# Patient Record
Sex: MALE | Race: WHITE
[De-identification: names, ages, dates, MRNs, and addresses within clinical notes are randomized per-mention and may not be internally consistent; named-entity substitution may affect disease eponyms.]

---

## 2019-10-07 ENCOUNTER — HOSPITAL ENCOUNTER (INPATIENT)
Dept: HOSPITAL 97 - ER | Age: 34
LOS: 6 days | Discharge: HOME | DRG: 896 | End: 2019-10-13
Attending: FAMILY MEDICINE | Admitting: HOSPITALIST
Payer: SELF-PAY

## 2019-10-07 VITALS — BODY MASS INDEX: 22.6 KG/M2

## 2019-10-07 DIAGNOSIS — K70.0: ICD-10-CM

## 2019-10-07 DIAGNOSIS — I10: ICD-10-CM

## 2019-10-07 DIAGNOSIS — J96.00: ICD-10-CM

## 2019-10-07 DIAGNOSIS — T51.0X1A: ICD-10-CM

## 2019-10-07 DIAGNOSIS — F10.231: Primary | ICD-10-CM

## 2019-10-07 LAB
HCT VFR BLD CALC: 38.3 % (ref 39.6–49)
INR BLD: 0.97
LYMPHOCYTES # SPEC AUTO: 1.2 K/UL (ref 0.7–4.9)
PMV BLD: 10.9 FL (ref 7.6–11.3)
RBC # BLD: 3.95 M/UL (ref 4.33–5.43)

## 2019-10-07 PROCEDURE — 96375 TX/PRO/DX INJ NEW DRUG ADDON: CPT

## 2019-10-07 PROCEDURE — 80329 ANALGESICS NON-OPIOID 1 OR 2: CPT

## 2019-10-07 PROCEDURE — 80307 DRUG TEST PRSMV CHEM ANLYZR: CPT

## 2019-10-07 PROCEDURE — 82746 ASSAY OF FOLIC ACID SERUM: CPT

## 2019-10-07 PROCEDURE — 85025 COMPLETE CBC W/AUTO DIFF WBC: CPT

## 2019-10-07 PROCEDURE — 80320 DRUG SCREEN QUANTALCOHOLS: CPT

## 2019-10-07 PROCEDURE — 93005 ELECTROCARDIOGRAM TRACING: CPT

## 2019-10-07 PROCEDURE — 82248 BILIRUBIN DIRECT: CPT

## 2019-10-07 PROCEDURE — 80074 ACUTE HEPATITIS PANEL: CPT

## 2019-10-07 PROCEDURE — 31500 INSERT EMERGENCY AIRWAY: CPT

## 2019-10-07 PROCEDURE — 84100 ASSAY OF PHOSPHORUS: CPT

## 2019-10-07 PROCEDURE — 80048 BASIC METABOLIC PNL TOTAL CA: CPT

## 2019-10-07 PROCEDURE — 82607 VITAMIN B-12: CPT

## 2019-10-07 PROCEDURE — 70450 CT HEAD/BRAIN W/O DYE: CPT

## 2019-10-07 PROCEDURE — 83735 ASSAY OF MAGNESIUM: CPT

## 2019-10-07 PROCEDURE — 99291 CRITICAL CARE FIRST HOUR: CPT

## 2019-10-07 PROCEDURE — 51702 INSERT TEMP BLADDER CATH: CPT

## 2019-10-07 PROCEDURE — 81003 URINALYSIS AUTO W/O SCOPE: CPT

## 2019-10-07 PROCEDURE — 97112 NEUROMUSCULAR REEDUCATION: CPT

## 2019-10-07 PROCEDURE — 94002 VENT MGMT INPAT INIT DAY: CPT

## 2019-10-07 PROCEDURE — 36415 COLL VENOUS BLD VENIPUNCTURE: CPT

## 2019-10-07 PROCEDURE — 83690 ASSAY OF LIPASE: CPT

## 2019-10-07 PROCEDURE — 96372 THER/PROPH/DIAG INJ SC/IM: CPT

## 2019-10-07 PROCEDURE — 84484 ASSAY OF TROPONIN QUANT: CPT

## 2019-10-07 PROCEDURE — 76700 US EXAM ABDOM COMPLETE: CPT

## 2019-10-07 PROCEDURE — 99292 CRITICAL CARE ADDL 30 MIN: CPT

## 2019-10-07 PROCEDURE — 85730 THROMBOPLASTIN TIME PARTIAL: CPT

## 2019-10-07 PROCEDURE — 84132 ASSAY OF SERUM POTASSIUM: CPT

## 2019-10-07 PROCEDURE — 94003 VENT MGMT INPAT SUBQ DAY: CPT

## 2019-10-07 PROCEDURE — 97161 PT EVAL LOW COMPLEX 20 MIN: CPT

## 2019-10-07 PROCEDURE — 80053 COMPREHEN METABOLIC PANEL: CPT

## 2019-10-07 PROCEDURE — 80076 HEPATIC FUNCTION PANEL: CPT

## 2019-10-07 PROCEDURE — 85610 PROTHROMBIN TIME: CPT

## 2019-10-07 PROCEDURE — 97116 GAIT TRAINING THERAPY: CPT

## 2019-10-07 PROCEDURE — 71045 X-RAY EXAM CHEST 1 VIEW: CPT

## 2019-10-07 PROCEDURE — 96374 THER/PROPH/DIAG INJ IV PUSH: CPT

## 2019-10-08 LAB
ALBUMIN SERPL BCP-MCNC: 3.6 G/DL (ref 3.4–5)
ALBUMIN SERPL BCP-MCNC: 3.7 G/DL (ref 3.4–5)
ALBUMIN SERPL BCP-MCNC: 4 G/DL (ref 3.4–5)
ALP SERPL-CCNC: 211 U/L (ref 45–117)
ALP SERPL-CCNC: 212 U/L (ref 45–117)
ALP SERPL-CCNC: 249 U/L (ref 45–117)
ALT SERPL W P-5'-P-CCNC: 673 U/L (ref 12–78)
ALT SERPL W P-5'-P-CCNC: 679 U/L (ref 12–78)
ALT SERPL W P-5'-P-CCNC: 700 U/L (ref 12–78)
AST SERPL W P-5'-P-CCNC: 722 U/L (ref 15–37)
AST SERPL W P-5'-P-CCNC: 758 U/L (ref 15–37)
AST SERPL W P-5'-P-CCNC: 878 U/L (ref 15–37)
BUN BLD-MCNC: 10 MG/DL (ref 7–18)
BUN BLD-MCNC: 8 MG/DL (ref 7–18)
BUN BLD-MCNC: 8 MG/DL (ref 7–18)
GIANT PLATELETS BLD QL SMEAR: (no result)
GLUCOSE SERPLBLD-MCNC: 105 MG/DL (ref 74–106)
GLUCOSE SERPLBLD-MCNC: 83 MG/DL (ref 74–106)
GLUCOSE SERPLBLD-MCNC: 86 MG/DL (ref 74–106)
HCT VFR BLD CALC: 39.7 % (ref 39.6–49)
HCT VFR BLD CALC: 40 % (ref 39.6–49)
INR BLD: 0.99
LIPASE SERPL-CCNC: 129 U/L (ref 73–393)
LIPASE SERPL-CCNC: 133 U/L (ref 73–393)
LYMPHOCYTES # SPEC AUTO: 0.9 K/UL (ref 0.7–4.9)
LYMPHOCYTES # SPEC AUTO: 1.3 K/UL (ref 0.7–4.9)
MAGNESIUM SERPL-MCNC: 2 MG/DL (ref 1.8–2.4)
METHAMPHET UR QL SCN: NEGATIVE
MORPHOLOGY BLD-IMP: (no result)
PMV BLD: 9.8 FL (ref 7.6–11.3)
PMV BLD: 9.9 FL (ref 7.6–11.3)
POTASSIUM SERPL-SCNC: 3 MMOL/L (ref 3.5–5.1)
POTASSIUM SERPL-SCNC: 3.3 MMOL/L (ref 3.5–5.1)
POTASSIUM SERPL-SCNC: 3.4 MMOL/L (ref 3.5–5.1)
RBC # BLD: 4.01 M/UL (ref 4.33–5.43)
RBC # BLD: 4.04 M/UL (ref 4.33–5.43)
THC SERPL-MCNC: NEGATIVE NG/ML

## 2019-10-08 PROCEDURE — 5A1945Z RESPIRATORY VENTILATION, 24-96 CONSECUTIVE HOURS: ICD-10-PCS

## 2019-10-08 PROCEDURE — 0BH17EZ INSERTION OF ENDOTRACHEAL AIRWAY INTO TRACHEA, VIA NATURAL OR ARTIFICIAL OPENING: ICD-10-PCS

## 2019-10-08 RX ADMIN — SODIUM CHLORIDE SCH MLS: 9 INJECTION, SOLUTION INTRAVENOUS at 10:31

## 2019-10-08 RX ADMIN — HALOPERIDOL LACTATE PRN MG: 5 INJECTION, SOLUTION INTRAMUSCULAR at 16:13

## 2019-10-08 RX ADMIN — HALOPERIDOL LACTATE PRN MG: 5 INJECTION, SOLUTION INTRAMUSCULAR at 23:19

## 2019-10-08 RX ADMIN — LEVETIRACETAM SCH MLS: 100 INJECTION, SOLUTION, CONCENTRATE INTRAVENOUS at 20:24

## 2019-10-08 RX ADMIN — MIDAZOLAM PRN MLS: 5 INJECTION INTRAMUSCULAR; INTRAVENOUS at 20:13

## 2019-10-08 RX ADMIN — Medication SCH ML: at 20:21

## 2019-10-08 RX ADMIN — LEVETIRACETAM SCH MLS: 100 INJECTION, SOLUTION, CONCENTRATE INTRAVENOUS at 10:31

## 2019-10-08 RX ADMIN — ENOXAPARIN SODIUM SCH MG: 40 INJECTION SUBCUTANEOUS at 10:32

## 2019-10-08 RX ADMIN — PROPOFOL PRN MLS: 10 INJECTION, EMULSION INTRAVENOUS at 23:10

## 2019-10-08 RX ADMIN — SODIUM CHLORIDE SCH MLS: 0.9 INJECTION, SOLUTION INTRAVENOUS at 22:20

## 2019-10-08 RX ADMIN — SODIUM CHLORIDE SCH: 0.9 INJECTION, SOLUTION INTRAVENOUS at 13:00

## 2019-10-08 RX ADMIN — Medication SCH ML: at 10:43

## 2019-10-08 RX ADMIN — PROPOFOL PRN MLS: 10 INJECTION, EMULSION INTRAVENOUS at 19:15

## 2019-10-08 RX ADMIN — PROPOFOL PRN MLS: 10 INJECTION, EMULSION INTRAVENOUS at 15:14

## 2019-10-08 RX ADMIN — SODIUM CHLORIDE SCH MLS: 0.9 INJECTION, SOLUTION INTRAVENOUS at 03:00

## 2019-10-08 NOTE — RAD REPORT
EXAM DESCRIPTION:     CT Head Without Intravenous Contrast

 

CLINICAL HISTORY:  The patient is 34 years old and is Male; CONFUSED

 

TECHNIQUE:  Axial computed tomography images of the head/brain without intravenous contrast.   Sagitt
al and coronal reformatted images were created and reviewed.   This CT exam was performed using one o
r more of the following dose reduction techniques:   automated exposure control, adjustment of the mA
 and/or kV according to patient size, and/or use of iterative reconstruction technique.

 

COMPARISON:  No relevant prior studies available.

 

FINDINGS:  BRAIN:   Unremarkable.   The gray-white matter differentiation is preserved . No hemorrhag
e.   No significant white matter disease.   No edema. No extra-axial fluid collections.

   VENTRICLES:   Unremarkable.   No ventriculomegaly.

   BONES/JOINTS:   No acute fracture.

   SOFT TISSUES:   Unremarkable.

   SINUSES:   Unremarkable as visualized.   No acute sinusitis.

   MASTOID AIR CELLS:   Unremarkable as visualized.   No mastoid effusion.

   ORBITS:   Unremarkable as visualized.

 

IMPRESSION:  No acute intracranial findings.

 

Electronically signed by:   Fiorella Smith MD   10/8/2019 6:26 AM CDT Workstation: [id number]

 

 

 

Due to temporary technical issues with the PACS/Fluency reporting system, reports are being signed by
 the in house radiologist as a courtesy to ensure prompt reporting. The interpreting radiologist is f
ully responsible for the content of the report.

## 2019-10-08 NOTE — ER
Nurse's Notes                                                                                     

 [facility]                                                                 

Name: Ricardo Pedro                                                                            

Age: 34 yrs                                                                                       

Sex: Male                                                                                         

: 1985                                                                                   

MRN: [id number]                                                                                   

Arrival Date: 10/07/2019                                                                          

Time: 23:30                                                                                       

Account#: [id number]                                                                            

Bed 2                                                                                             

Private MD:                                                                                       

Diagnosis: Delirium due to known physiological condition;Alcohol abuse                            

                                                                                                  

Presentation:                                                                                     

10/07                                                                                             

23:31 Presenting complaint: EMS states: PD on scene. pt girlfriend called stating pt was      ak1 

      "having a seizure" pt A\T\OX3. pt admits to "using weed to sleep" to EMS. pt denies drug    

      use to nurse during triage. EMS administered 100mL NS through 18g at the right forearm.     

      Transition of care: patient was not received from another setting of care. Onset of         

      symptoms was 2019. Risk Assessment: Do you want to hurt yourself or someone     

      else? Patient reports no desire to harm self or others. Initial Sepsis Screen: Does the     

      patient meet any 2 criteria? No. Patient's initial sepsis screen is negative. Does the      

      patient have a suspected source of infection? No. Patient's initial sepsis screen is        

      negative. Care prior to arrival: None.                                                      

23:31 Method Of Arrival: EMS: [facility]                                                ak1 

23:31 Acuity: LILY 1                                                                           bb  

                                                                                                  

Triage Assessment:                                                                                

23:33 General: Appears in no apparent distress. well groomed, Behavior is cooperative,        ak1 

      anxious, restless. Pain: Complains of pain in jaw pain. EENT: No signs and/or symptoms      

      were reported regarding the EENT system. Neuro: Level of Consciousness is awake, alert,     

      obeys commands, Oriented to person, place, situation,  are equal bilaterally Moves     

      all extremities. Full function Speech is normal, Facial symmetry appears normal, Pupils     

      are dilated. Cardiovascular: Patient's skin is warm and dry. Rhythm is sinus                

      tachycardia. Respiratory: Airway is patent Respiratory effort is even, unlabored. GI:       

      No signs and/or symptoms were reported involving the gastrointestinal system. : No        

      signs and/or symptoms were reported regarding the genitourinary system. Derm: No signs      

      and/or symptoms reported regarding the dermatologic system. Musculoskeletal: No signs       

      and/or symptoms reported regarding the musculoskeletal system.                              

                                                                                                  

Historical:                                                                                       

- Allergies:                                                                                      

23:33 No Known Allergies;                                                                     ak1 

- Home Meds:                                                                                      

23:33 None [Active];                                                                          ak1 

- PMHx:                                                                                           

23:33 Hypertension;                                                                           ak1 

- PSHx:                                                                                           

23:33 None;                                                                                   ak1 

                                                                                                  

- Immunization history:: Adult Immunizations unknown.                                             

- Social history:: Smoking status: Patient uses tobacco products, smokes two packs                

  cigarettes per day. Patient/guardian denies using alcohol, street drugs.                        

- Ebola Screening: : No symptoms or risks identified at this time.                                

                                                                                                  

                                                                                                  

Screenin:38 Abuse screen: Denies threats or abuse. Denies injuries from another. Nutritional        ak1 

      screening: No deficits noted. Tuberculosis screening: No symptoms or risk factors           

      identified. Fall Risk IV access (20 points).                                                

                                                                                                  

Assessment:                                                                                       

23:37 Reassessment: Patient appears in no apparent distress at this time. No changes from     ak1 

      previously documented assessment. Patient and/or family updated on plan of care and         

      expected duration. Pain level reassessed. pt girlfriend at bedside.                         

23:38 Reassessment: EMS reported pt had been combative earlier in the night per the pt        ak1 

      girlfriend. .                                                                               

10/08                                                                                             

00:30 Reassessment: Patient appears in no apparent distress at this time. No changes from     ak1 

      previously documented assessment. Patient and/or family updated on plan of care and         

      expected duration. Pain level reassessed.                                                   

01:00 Reassessment: pt and girlfriend informed of pt need to be admitted and treated for ETOH ak1 

      withdrawal.                                                                                 

01:49 Reassessment: PT's girlfriend reports patient drinks half a liter of vodka per day      jb4 

      sometime followed by beer. Girlfriend reports last drink was on Saturday.                   

02:05 Reassessment: Patient appears in no apparent distress at this time. Patient and/or      jb4 

      family updated on plan of care and expected duration. Pain level reassessed. Pt is          

      sitting up in bed with no s/s of pain or distress noted. respirations are even and          

      unlabored, and symmetrical.                                                                 

02:15 Reassessment: Pt admitted to ER Hold, see [facility] for further documentation.           jb4 

05:00 Reassessment: Dr. Skinner and Dr. Celestin at bedside to reassess patient.                    lp1 

06:00 Reassessment: Returned from CT at this time.                                            lp1 

                                                                                                  

Vital Signs:                                                                                      

10/07                                                                                             

23:25  / 109; Pulse 123; Resp 20; Temp 98.7(O); Pulse Ox 96% on R/A; Weight 79.38 kg    ak1 

      (R); Height 6 ft. 0 in. (182.88 cm) (R); Pain 3/10;                                         

23:37  / 102; Pulse 110; Resp 18; Pulse Ox 95% on R/A;                                  ak1 

10/08                                                                                             

00:34  / 101; Pulse 106; Resp 18; Pulse Ox 95% on R/A;                                  ak1 

01:15  / 88; Pulse 108; Resp 22; Pulse Ox 95% on R/A;                                   ak1 

01:30  / 93; Pulse 91; Pulse Ox 96% on R/A;                                             ak1 

02:00  / 95; Pulse 96; Resp 24; Pulse Ox 98% on R/A;                                    jb4 

05:30 Pulse 108; Resp 20 A; Pulse Ox 98% on BVM;                                              lp1 

05:34  / 82; Pulse 92; Resp 22; Pulse Ox 99% on BVM;                                    lp1 

05:40  / 144; Pulse 135; Resp 18; Pulse Ox 100% on BVM;                                 lp1 

06:10  / 101; Pulse 78; Resp 10;                                                        ak1 

06:30  / 96; Pulse 72; Resp 14; Temp 96.8(C); Pulse Ox 99% on ETT vent;                 ak1 

07:00  / 94; Pulse 68; Resp 14; Temp 95.9(C); Pulse Ox 95.9% on ETT vent;               ak1 

10/07                                                                                             

23:25 Body Mass Index 23.73 (79.38 kg, 182.88 cm)                                             ak1 

                                                                                                  

[place] Coma Score:                                                                               

10/07                                                                                             

23:33 Eye Response: spontaneous(4). Verbal Response: oriented(5). Motor Response: obeys       ak1 

      commands(6). Total: 15.                                                                     

                                                                                                  

ED Course:                                                                                        

23:25 Arm band placed on Patient placed in an exam room, on a stretcher, on cardiac monitor,  ak1 

      on pulse oximetry, Patient notified of wait time. EKG completed in triage. Results          

      shown to MD.                                                                                

23:30 Patient arrived in ED.                                                                  ak1 

23:31 Inserted saline lock: 20 gauge in left forearm, using aseptic technique. Blood          jb5 

      collected.                                                                                  

23:33 Triage completed.                                                                       ak1 

23:35 Seizure precautions initiated. Cardiac monitor on. Pulse ox on. NIBP on. Door closed.   ak1 

23:35 Initial lab(s) drawn, by ED staff, sent to lab. EKG done, by ED staff. Maintain EMS IV. ak1 

      Dressing intact. Site clean \T\ dry. Gauge \T\ site: 18g right forearm.                     

23:41 Jorge Celestin MD is Attending Physician.                                              gs  

23:44 Acetaminophen Sent.                                                                     jb5 

23:44 Basic Metabolic Panel Sent.                                                             jb5 

23:44 CBC with Diff Sent.                                                                     jb5 

23:44 ETOH Level Sent.                                                                        jb5 

23:44 Hepatic Function Sent.                                                                  jb5 

23:44 PT-INR Sent.                                                                            jb5 

23:44 Ptt, Activated Sent.                                                                    jb5 

23:44 Salicylate Sent.                                                                        jb5 

23:47 Pia Otoole, RN is Primary Nurse.                                                     ak1 

10/08                                                                                             

00:26 Notified ED physician of a critical lab result(s). , .                    lp1 

01:18 Raymundo Skinner MD is Hospitalizing Provider.                                           gs  

01:37 No provider procedures requiring assistance completed. Patient admitted, IV remains in  ak1 

      place.                                                                                      

05:30 20g IV to L FA and 18g IV to R FA pulled out while patient being combative.             lp1 

05:30 Inserted saline lock: 18 gauge in right upper arm, using aseptic technique. By Carolyn Omalley RN.                                                                                

05:37 Assisted provider with intubation using 8.0 mm ETT via oral route. ET tube secured at   lp1 

      23cm at the teeth. Set up intubation tray. Intubated by Jorge Celestin MD Placement          

      verified by CXR, CO2 detector w/ + color change, auscultating bilateral breath sounds.      

05:45 NGT: inserted 18 Fr. other oral verified placement of air over stomach, verified return lp1 

      of gastric contents, Placement verified by X-ray, to intermittent suction. Returned         

      gastric contents.                                                                           

06:05 RT adjusted ET tube at this time per Dr. Celestin; ET tube at 24 at the teeth.             lp1 

06:12 Kohler cath inserted, using sterile technique, 16 Fr., balloon inflated, to gravity      lp1 

      drainage, other By Pia Otoole RN Patient tolerated well.                                 

06:25 Inserted saline lock: 18 gauge in left forearm, using aseptic technique. By Carolyn Omalley RN.                                                                                

                                                                                                  

Administered Medications:                                                                         

10/07                                                                                             

23:00 Drug: NS 0.9% 1000 ml {Note: EMS bolus continued .} Route: IV; Rate: 1 bolus; Site:     ak1 

      right forearm;                                                                              

23:48 Follow up: IV Status: Completed infusion; IV Intake: 1000ml                             ak1 

23:47 Drug: Ativan 1 mg Route: IVP; Site: right forearm;                                      ak1 

23:48 Follow up: Response: No adverse reaction                                                ak1 

10/08                                                                                             

00:39 Drug: Ativan 2 mg Route: IVP; Site: right forearm;                                      ak1 

01:13 Follow up: Response: No adverse reaction                                                ak1 

05:04 Drug: Geodon 20 mg Route: IM; Site: left vastus lateralis;                              lp1 

05:30 Follow up: Response: No adverse reaction; No change in condition                        lp1 

05:30 Drug: Versed 4 mg Route: IVP; Site: right upper arm;                                    lp1 

05:40 Follow up: Response: No adverse reaction                                                lp1 

05:36 Drug: Etomidate 10 mg Route: IVP; Site: right upper arm;                                lp1 

05:40 Follow up: Response: No adverse reaction                                                lp1 

05:36 Drug: Succinylcholine 120 mg Route: IVP; Site: right upper arm;                         lp1 

05:40 Follow up: Response: Marked relief of symptoms                                          lp1 

05:40 Drug: Propofol 50 mg Route: IVP; Site: right upper arm;                                 lp1 

06:00 Follow up: Response: Marked relief of symptoms                                          lp1 

05:45 Drug: Propofol 5 mcg/kg/min Route: IV; Rate: calculated rate; Site: right upper arm;    lp1 

05:50 Follow up: Rate change 20 mcg/kg/min                                                    lp1 

06:43 Follow up: Rate change 30 mcg/kg/min                                                    lp1 

06:25 Drug: NS 0.9% 1000 ml Route: IV; Rate: 100 ml/hr; Site: right upper arm;                lp1 

06:25 Drug: Versed 2 mg Route: IVP; Site: left forearm;                                       ak1 

                                                                                                  

                                                                                                  

Intake:                                                                                           

10/07                                                                                             

23:48 IV: 1000ml; Total: 1000ml.                                                              ak1 

                                                                                                  

Output:                                                                                           

10/08                                                                                             

06:15 Urine: 450ml (Kohler); Total: 450ml.                                                     lp1 

                                                                                                  

Outcome:                                                                                          

01:22 Decision to Hospitalize by Provider.                                                    gs  

01:37 Admitted to ER Hold.  Please see [facility] for further documentation.                    ak1 

01:37 Condition: improved                                                                         

01:37 Instructed on the need for admit.                                                           

07:48 Patient left the ED.                                                                      

                                                                                                  

Signatures:                                                                                       

Carolyn Omalley RN                     RN   bb                                                   

Silvia Marrero, SUGEY                      RN   ss                                                   

Venice Cleveland RN                         RN   lp1                                                  

Pia Otoole RN                       RN   ak1                                                  

Jay Escamilla RN                       RN   jb4                                                  

Greta Henry                          jb5                                                  

Jorge Celestin MD MD gs                                                   

                                                                                                  

Corrections: (The following items were deleted from the chart)                                    

01:15 00:30 Reassessment: pt and girlfriend informed of pt need to be admitted and treated    ak1 

      for ETOH withdrawal. ak1                                                                    

06:10 10/07 23:31 Acuity: LILY 3 ak1                                                           bb  

10/08                                                                                             

06:47 05:00 Reassessment: Dr. Skinner at bedside to reassess patient lp1                         lp1 

                                                                                                  

**************************************************************************************************

## 2019-10-08 NOTE — RAD REPORT
EXAM DESCRIPTION:  RAD - Chest Single View - 10/8/2019 5:56 am

 

CLINICAL HISTORY:  POST ETT

Chest pain.

 

COMPARISON:  <Comparisons>

 

FINDINGS:  Portable technique limits examination quality.

 

The lungs are grossly clear. The heart is normal in size. ET tube tip is above the catia.Enteric tub
e descends in the stomach.

 

IMPRESSION:  No acute intrathoracic process suspected.

## 2019-10-08 NOTE — EKG
Test Date:    2019-10-07               Test Time:    23:26:43

Technician:   DOMINIC                                    

                                                     

MEASUREMENT RESULTS:                                       

Intervals:                                           

Rate:         112                                    

GA:           164                                    

QRSD:         102                                    

QT:           342                                    

QTc:          466                                    

Axis:                                                

P:            48                                     

GA:           164                                    

QRS:          69                                     

T:            23                                     

                                                     

INTERPRETIVE STATEMENTS:                                       

                                                     

Sinus tachycardia

Incomplete right bundle branch block

Septal infarct, age undetermined

Abnormal ECG

No previous ECG available for comparison



Electronically Signed On 10-08-19 06:10:47 CDT by Иван Boone

## 2019-10-08 NOTE — P.HP
Certification for Inpatient


Patient admitted to: Inpatient


With expected LOS: >2 Midnights


Patient will require the following post-hospital care: Home Health Services


Practitioner: I am a practitioner with admitting privileges, knowledge of 

patient current condition, hospital course, and medical plan of care.


Services: Services provided to patient in accordance with Admission 

requirements found in Title 42 Section 412.3 of the Code of Federal Regulations





Patient History


Date of Service: 10/08/19


Reason for admission:  delirium tremens


History of Present Illness: 





  Patient is a 34-year-old gentleman who comes into the hospital going through 

[facility].  Patient drinks about a half of 1 L of vodka every day.  Patient had gone 

out of town to [place].  Over the last couple of days he has had no alcohol.  

He had been feeling poorly during the trip and had a few episodes of nausea and 

vomiting as well.  Patient was seen in the ER and admitted to the hospital for 

further workup.  Patient started becoming more obtunded in the ER. Decision was 

made to intubate and sedate as patient has high risk of aspiration and fall.  

patient became very combative while in the emergency room.  After giving IV 

Versed patient finally relaxed and had to be intubated. Patient had a CT of the 

head which was negative. Patient will be admitted for further evaluation. 


Allergies





No Known Allergies Allergy (Unverified 10/08/19 01:31)


 





Home Medications: 








NK [No Home Meds]  10/08/19 








- Past Medical/Surgical History


Diabetic: No


-: Hypertension


Past Surgical History: Patient denies surgical history





- Family History


  ** Father


Family History: Reviewed- Non-Contributory





- Social History


Smoking Status: Current every day smoker


Alcohol use: Yes


CD- Drugs: No


Caffeine use: Yes


Place of Residence: Home





Review of Systems


10-point ROS is otherwise unremarkable





Physical Examination





- Vital Signs


Temperature: 95.9 F


Blood Pressure: 135/94


Pulse: 68


Respirations: 14


Pulse Ox (%): 95





- Physical Exam


General: Confused, Delirious, Unresponsive


HEENT: Atraumatic, PERRLA, Mucous membr. moist/pink, EOMI, Sclerae nonicteric


Neck: Supple, 2+ carotid pulse no bruit, No LAD, Without JVD or thyroid 

abnormality


Respiratory: Clear to auscultation bilaterally, Normal air movement


Cardiovascular: Regular rate/rhythm, Normal S1 S2, No murmurs


Gastrointestinal: Normal bowel sounds, Soft and benign, Non-distended, 

Tenderness


Musculoskeletal: No clubbing, No swelling, No tenderness


Integumentary: No rashes


Neurological: Normal gait, Normal speech, Normal strength at 5/5 x4 extr, 

Normal tone, Normal affect


Lymphatics: No axilla or inguinal lymphadenopathy


Urinary: Dialysis catheter


External genitalia: No edema


Rectal: Normal





- Studies


Laboratory Data (last 24 hrs)





10/08/19 00:00: Lipase 407 H


10/07/19 23:30: PT 11.5, INR 0.97, APTT 31.3


10/07/19 23:30: WBC 4.3, Hgb 14.1, Hct 38.3 L, Plt Count 308


10/07/19 23:30: Sodium 137, Potassium 3.4 L, BUN 10, Creatinine 0.94, Glucose 

105, Total Bilirubin 1.0,  H*,  H*, Alkaline Phosphatase 249 H








Assessment & Plan





- Problems (Diagnosis)


(1) Respiratory failure


Current Visit: Yes   Status: Acute   





(2) Delirium tremens


Current Visit: Yes   Status: Acute   





(3) AMS (altered mental status)


Current Visit: Yes   Status: Acute   





- Plan








1.  Vent management/  IV hydration


2.  Antibiotic


3.  IV sedation 


4. pain control


5. GI DVT prophylaxis


6.  Continue monitoring labs closely while intubated


7.  Monitor chest x-ray daily


Discharge Plan: Home


Plan to discharge in: 24 Hours





- Advance Directives


Does patient have a Living Will: No


Does patient have a Durable POA for Healthcare: No





- Code Status/Comfort Care


Code Status Assessed: Yes


Code Status: Full Code


Critical Care: No


Time Spent Managing PTS Care (In Minutes): 110

## 2019-10-08 NOTE — EDPHYS
Physician Documentation                                                                           

 [facility]                                                                 

Name: Ricardo Pedro                                                                            

Age: 34 yrs                                                                                       

Sex: Male                                                                                         

: 1985                                                                                   

MRN: [id number]                                                                                   

Arrival Date: 10/07/2019                                                                          

Time: 23:30                                                                                       

Account#: [id number]                                                                            

Bed 2                                                                                             

Private MD:                                                                                       

ED Physician Jorge Celestin                                                                       

HPI:                                                                                              

10/08                                                                                             

01:41 This 34 yrs old  Male presents to ER via EMS with complaints of Probable       gs  

      Seizure.                                                                                    

01:41 The patient presents after having a single isolated seizure. Character of seizure(s):   gs  

      Loss of consciousness: the patient did not lose consciousness, Incontinence: none,          

      Apnea: the patient did not experience apnea. Seizure onset: just prior to arrival.          

      Context: the seizure(s) was witnessed, by a friend. Seizure Hx: the patient has no          

      previous seizure history, Cause: alcohol abuse history. Associated injury: The patient      

      did not suffer any apparent associated injury. The patient has not experienced similar      

      symptoms in the past. last drink last Saturday, pt is heavy drinker.                        

                                                                                                  

Historical:                                                                                       

- Allergies:                                                                                      

10/07                                                                                             

23:33 No Known Allergies;                                                                     ak1 

- Home Meds:                                                                                      

23:33 None [Active];                                                                          ak1 

- PMHx:                                                                                           

23:33 Hypertension;                                                                           ak1 

- PSHx:                                                                                           

23:33 None;                                                                                   ak1 

                                                                                                  

- Immunization history:: Adult Immunizations unknown.                                             

- Social history:: Smoking status: Patient uses tobacco products, smokes two packs                

  cigarettes per day. Patient/guardian denies using alcohol, street drugs.                        

- Ebola Screening: : No symptoms or risks identified at this time.                                

                                                                                                  

                                                                                                  

ROS:                                                                                              

10/08                                                                                             

01:41 All other systems are negative.                                                         gs  

                                                                                                  

Exam:                                                                                             

01:41 Head/Face:  Normocephalic, atraumatic. Eyes:  Pupils equal round and reactive to light, gs  

      extra-ocular motions intact.  Lids and lashes normal.  Conjunctiva and sclera are           

      non-icteric and not injected.  Cornea within normal limits.  Periorbital areas with no      

      swelling, redness, or edema. ENT:  Nares patent. No nasal discharge, no septal              

      abnormalities noted.  Tympanic membranes are normal and external auditory canals are        

      clear.  Oropharynx with no redness, swelling, or masses, exudates, or evidence of           

      obstruction, uvula midline.  Mucous membranes moist. Neck:  Trachea midline, no             

      thyromegaly or masses palpated, and no cervical lymphadenopathy.  Supple, full range of     

      motion without nuchal rigidity, or vertebral point tenderness.  No Meningismus.             

      Chest/axilla:  Normal chest wall appearance and motion.  Nontender with no deformity.       

      No lesions are appreciated.                                                                 

01:41 Respiratory:  Lungs have equal breath sounds bilaterally, clear to auscultation and         

      percussion.  No rales, rhonchi or wheezes noted.  No increased work of breathing, no        

      retractions or nasal flaring. Abdomen/GI:  Soft, non-tender, with normal bowel sounds.      

      No distension or tympany.  No guarding or rebound.  No evidence of tenderness               

      throughout. Back:  No spinal tenderness.  No costovertebral tenderness.  Full range of      

      motion. Skin:  Warm, dry with normal turgor.  Normal color with no rashes, no lesions,      

      and no evidence of cellulitis. MS/ Extremity:  Pulses equal, no cyanosis.                   

      Neurovascular intact.  Full, normal range of motion. Neuro:  Awake and alert, GCS 15,       

      oriented to person, place, time, and situation.  Cranial nerves II-XII grossly intact.      

      Motor strength 5/5 in all extremities.  Sensory grossly intact.  Cerebellar exam            

      normal.  Normal gait.                                                                       

01:41 Constitutional: The patient appears alert, awake.                                           

01:41 Cardiovascular: Rate: tachycardic, Rhythm: regular, Pulses: no pulse deficits are           

      appreciated.                                                                                

01:41 ECG was reviewed by the Attending Physician.                                                

                                                                                                  

Vital Signs:                                                                                      

10/07                                                                                             

23:25  / 109; Pulse 123; Resp 20; Temp 98.7(O); Pulse Ox 96% on R/A; Weight 79.38 kg    ak1 

      (R); Height 6 ft. 0 in. (182.88 cm) (R); Pain 3/10;                                         

23:37  / 102; Pulse 110; Resp 18; Pulse Ox 95% on R/A;                                  ak1 

10/08                                                                                             

00:34  / 101; Pulse 106; Resp 18; Pulse Ox 95% on R/A;                                  ak1 

01:15  / 88; Pulse 108; Resp 22; Pulse Ox 95% on R/A;                                   ak1 

01:30  / 93; Pulse 91; Pulse Ox 96% on R/A;                                             ak1 

02:00  / 95; Pulse 96; Resp 24; Pulse Ox 98% on R/A;                                    jb4 

05:30 Pulse 108; Resp 20 A; Pulse Ox 98% on BVM;                                              lp1 

05:34  / 82; Pulse 92; Resp 22; Pulse Ox 99% on BVM;                                    lp1 

05:40  / 144; Pulse 135; Resp 18; Pulse Ox 100% on BVM;                                 lp1 

06:10  / 101; Pulse 78; Resp 10;                                                        ak1 

06:30  / 96; Pulse 72; Resp 14; Temp 96.8(C); Pulse Ox 99% on ETT vent;                 ak1 

07:00  / 94; Pulse 68; Resp 14; Temp 95.9(C); Pulse Ox 95.9% on ETT vent;               ak1 

10/07                                                                                             

23:25 Body Mass Index 23.73 (79.38 kg, 182.88 cm)                                             ak1 

                                                                                                  

Jose Alejandro Coma Score:                                                                               

10/07                                                                                             

23:33 Eye Response: spontaneous(4). Verbal Response: oriented(5). Motor Response: obeys       ak1 

      commands(6). Total: 15.                                                                     

                                                                                                  

Procedures:                                                                                       

10/08                                                                                             

05:41 Intubation: Ventilated with 100% NRB prior to procedure. O2 saturation prior to         gs  

      procedure was 100 %. Intubated orally using # 4 Luna blade with 8.0 mm ETT. was        

      successful on first attempt. Ventilated with Ambu bag. Tube secured with ETT torres         

      Placement verified by CXR, CO2 detector with (+) color change, auscultating bilateral       

      breath sounds, O2 saturation after procedure was 100 %. Patient tolerated well, pt          

      decompensated very agitated danger to self others unable to maintain airway with            

      sedation thus required ett.                                                                 

                                                                                                  

MDM:                                                                                              

00:29 Patient medically screened.                                                               

01:41 Differential diagnosis: cardiac arrhythmia, seizure, dt, etoh withdrawl. Data reviewed:   

      vital signs, nurses notes. Counseling: I had a detailed discussion with the patient         

      and/or guardian regarding: the historical points, exam findings, and any diagnostic         

      results supporting the discharge/admit diagnosis, the need for further work-up and          

      treatment in the hospital. Response to treatment: the patient's symptoms have markedly      

      improved after treatment, the patient's condition has returned to base line.                

                                                                                                  

10/07                                                                                             

23:36 Order name: Acetaminophen; Complete Time: 00:30                                         ak1 

10/07                                                                                             

23:36 Order name: Basic Metabolic Panel; Complete Time: 00:30                                 ak1 

10/07                                                                                             

23:36 Order name: CBC with Diff; Complete Time: 00:30                                         ak1 

10/07                                                                                             

23:36 Order name: ETOH Level; Complete Time: 00:30                                            ak1 

10/07                                                                                             

23:36 Order name: Hepatic Function; Complete Time: 00:30                                      ak1 

10/07                                                                                             

23:36 Order name: PT-INR; Complete Time: 00:30                                                ak1 

10/07                                                                                             

23:36 Order name: Ptt, Activated; Complete Time: 00:30                                        ak1 

10/07                                                                                             

23:36 Order name: Salicylate; Complete Time: 00:30                                            ak1 

10/07                                                                                             

23:36 Order name: Urine Drug Screen; Complete Time: 01:52                                     ak1 

10/08                                                                                             

00:31 Order name: Lipase; Complete Time: 01:11                                                gs  

10/08                                                                                             

00:33 Order name: Hepatitis Panel                                                               

10/08                                                                                             

01:13 Order name: Urine Dipstick--Ancillary (enter results); Complete Time: 07:28             [facility] 

10/08                                                                                             

02:12 Order name: CBC with Automated Diff                                                     EDMS

10/08                                                                                             

02:12 Order name: CBC with Automated Diff                                                     EDMS

10/08                                                                                             

02:12 Order name: Comprehensive Metabolic Panel                                               EDMS

10/08                                                                                             

02:12 Order name: Comprehensive Metabolic Panel                                               EDMS

10/08                                                                                             

02:12 Order name: Protime (+INR)                                                              EDMS

10/08                                                                                             

02:12 Order name: Protime (+INR)                                                              EDMS

10/08                                                                                             

02:12 Order name: PTT, Activated Partial Thromb                                               EDMS

10/08                                                                                             

02:12 Order name: PTT, Activated Partial Thromb                                               EDMS

10/08                                                                                             

02:14 Order name: CBC with Automated Diff                                                     EDMS

10/08                                                                                             

02:14 Order name: Comprehensive Metabolic Panel                                               EDMS

10/08                                                                                             

02:14 Order name: Lipase                                                                      EDMS

10/08                                                                                             

02:14 Order name: Protime (+INR)                                                              EDMS

10/08                                                                                             

02:14 Order name: PTT, Activated Partial Thromb                                               EDMS

10/08                                                                                             

05:41 Order name: Chest Single View XRAY                                                        

10/08                                                                                             

05:41 Order name: CT Head Brain wo Cont                                                         

10/08                                                                                             

06:32 Order name: RAD; Complete Time: 07:28                                                   EDMS

10/07                                                                                             

23:36 Order name: EKG; Complete Time: 23:37                                                   ak1 

10/07                                                                                             

23:36 Order name: EKG - Nurse/Tech; Complete Time: 23:36                                      ak1 

10/07                                                                                             

23:36 Order name: IV Saline Lock; Complete Time: 23:36                                        ak1 

10/07                                                                                             

23:36 Order name: Labs collected and sent; Complete Time: 23:44                               ak1 

10/07                                                                                             

23:36 Order name: Urine Dipstick-Ancillary (obtain specimen); Complete Time: 01:13            ak1 

10/08                                                                                             

02:12 Order name: CONS Pharmacy Consult                                                       EDMS

10/08                                                                                             

02:12 Order name: Regular                                                                     EDMS

                                                                                                  

EC:41 Rate is 112 beats/min. Rhythm is regular. OR interval is normal. QRS interval is        gs  

      prolonged. T waves are Normal. No ST changes noted. Clinical impression: Abnormal EKG       

      without significant change. Interpreted by me.                                              

                                                                                                  

Administered Medications:                                                                         

10/07                                                                                             

23:00 Drug: NS 0.9% 1000 ml {Note: EMS bolus continued .} Route: IV; Rate: 1 bolus; Site:     [facility] 

      right forearm;                                                                              

23:48 Follow up: IV Status: Completed infusion; IV Intake: 1000ml                             ak1 

23:47 Drug: Ativan 1 mg Route: IVP; Site: right forearm;                                      ak1 

23:48 Follow up: Response: No adverse reaction                                                ak1 

10/08                                                                                             

00:39 Drug: Ativan 2 mg Route: IVP; Site: right forearm;                                      ak1 

01:13 Follow up: Response: No adverse reaction                                                ak1 

05:04 Drug: Geodon 20 mg Route: IM; Site: left vastus lateralis;                              lp1 

05:30 Follow up: Response: No adverse reaction; No change in condition                        lp1 

05:30 Drug: Versed 4 mg Route: IVP; Site: right upper arm;                                    lp1 

05:40 Follow up: Response: No adverse reaction                                                lp1 

05:36 Drug: Etomidate 10 mg Route: IVP; Site: right upper arm;                                lp1 

05:40 Follow up: Response: No adverse reaction                                                lp1 

05:36 Drug: Succinylcholine 120 mg Route: IVP; Site: right upper arm;                         lp1 

05:40 Follow up: Response: Marked relief of symptoms                                          lp1 

05:40 Drug: Propofol 50 mg Route: IVP; Site: right upper arm;                                 lp1 

06:00 Follow up: Response: Marked relief of symptoms                                          lp1 

05:45 Drug: Propofol 5 mcg/kg/min Route: IV; Rate: calculated rate; Site: right upper arm;    lp1 

05:50 Follow up: Rate change 20 mcg/kg/min                                                    lp1 

06:43 Follow up: Rate change 30 mcg/kg/min                                                    lp1 

06:25 Drug: NS 0.9% 1000 ml Route: IV; Rate: 100 ml/hr; Site: right upper arm;                lp1 

06:25 Drug: Versed 2 mg Route: IVP; Site: left forearm;                                       ak1 

                                                                                                  

                                                                                                  

Disposition:                                                                                      

10/08/19 01:22 Hospitalization ordered by Raymundo Skinner for Inpatient Admission. Preliminary     

  diagnosis are Delirium due to known physiological condition, Alcohol abuse.                     

- Bed requested for Intensive Care Unit.                                                          

- Status is Inpatient Admission.                                                              ss  

- Condition is Stable.                                                                            

- Problem is new.                                                                                 

- Symptoms have improved.                                                                         

UTI on Admission? No                                                                              

                                                                                                  

                                                                                                  

                                                                                                  

Signatures:                                                                                       

Dispatcher [employer]                           EDMS                                                 

Robby Sauer MD MD cha Smirch, Shelby, RN                      RN   ss                                                   

Venice Cleveland RN                         RN   lp1                                                  

Pia Otoole RN                       RN   ak1                                                  

Dia Roy RN RN   cg                                                   

Jorge Celestin MD MD gs                                                   

                                                                                                  

Corrections: (The following items were deleted from the chart)                                    

05:49 01:22 Hospitalization Ordered by Raymundo Skinner MD for Inpatient Admission. Preliminary  cg  

      diagnosis is Delirium due to known physiological condition; Alcohol abuse. Bed              

      requested for Telemetry/MedSurg (observation). Status is Inpatient Admission. Condition     

      is Stable. Problem is new. Symptoms have improved. UTI on Admission? No.                  

07:48 05:49 10/08/2019 01:22 Hospitalization Ordered by Raymundo Skinner MD for Inpatient        ss  

      Admission. Preliminary diagnosis is Delirium due to known physiological condition;          

      Alcohol abuse. Bed requested for Intensive Care Unit. Status is Inpatient Admission.        

      Condition is Stable. Problem is new. Symptoms have improved. UTI on Admission? No.        

                                                                                                  

**************************************************************************************************

## 2019-10-09 LAB
ALBUMIN SERPL BCP-MCNC: 3.2 G/DL (ref 3.4–5)
ALP SERPL-CCNC: 182 U/L (ref 45–117)
ALT SERPL W P-5'-P-CCNC: 634 U/L (ref 12–78)
AST SERPL W P-5'-P-CCNC: 543 U/L (ref 15–37)
BUN BLD-MCNC: 10 MG/DL (ref 7–18)
GLUCOSE SERPLBLD-MCNC: 75 MG/DL (ref 74–106)
HCT VFR BLD CALC: 35.8 % (ref 39.6–49)
INR BLD: 0.98
LYMPHOCYTES # SPEC AUTO: 1.1 K/UL (ref 0.7–4.9)
MAGNESIUM SERPL-MCNC: 1.9 MG/DL (ref 1.8–2.4)
PMV BLD: 9.8 FL (ref 7.6–11.3)
POTASSIUM SERPL-SCNC: 3.2 MMOL/L (ref 3.5–5.1)
RBC # BLD: 3.61 M/UL (ref 4.33–5.43)

## 2019-10-09 RX ADMIN — MIDAZOLAM PRN MLS: 5 INJECTION INTRAMUSCULAR; INTRAVENOUS at 20:10

## 2019-10-09 RX ADMIN — LEVETIRACETAM SCH MLS: 100 INJECTION, SOLUTION, CONCENTRATE INTRAVENOUS at 09:01

## 2019-10-09 RX ADMIN — FENTANYL CITRATE PRN MCG: 50 INJECTION, SOLUTION INTRAMUSCULAR; INTRAVENOUS at 09:20

## 2019-10-09 RX ADMIN — PROPOFOL PRN MLS: 10 INJECTION, EMULSION INTRAVENOUS at 11:00

## 2019-10-09 RX ADMIN — FENTANYL CITRATE PRN MCG: 50 INJECTION, SOLUTION INTRAMUSCULAR; INTRAVENOUS at 16:00

## 2019-10-09 RX ADMIN — LEVETIRACETAM SCH MLS: 100 INJECTION, SOLUTION, CONCENTRATE INTRAVENOUS at 20:09

## 2019-10-09 RX ADMIN — Medication SCH ML: at 09:01

## 2019-10-09 RX ADMIN — SODIUM CHLORIDE SCH: 0.9 INJECTION, SOLUTION INTRAVENOUS at 09:00

## 2019-10-09 RX ADMIN — FENTANYL CITRATE PRN MCG: 50 INJECTION, SOLUTION INTRAMUSCULAR; INTRAVENOUS at 21:50

## 2019-10-09 RX ADMIN — MIDAZOLAM PRN MLS: 5 INJECTION INTRAMUSCULAR; INTRAVENOUS at 12:39

## 2019-10-09 RX ADMIN — PROPOFOL PRN MLS: 10 INJECTION, EMULSION INTRAVENOUS at 15:00

## 2019-10-09 RX ADMIN — FENTANYL CITRATE PRN MCG: 50 INJECTION, SOLUTION INTRAMUSCULAR; INTRAVENOUS at 19:50

## 2019-10-09 RX ADMIN — PROPOFOL PRN MLS: 10 INJECTION, EMULSION INTRAVENOUS at 07:31

## 2019-10-09 RX ADMIN — ENOXAPARIN SODIUM SCH MG: 40 INJECTION SUBCUTANEOUS at 09:07

## 2019-10-09 RX ADMIN — PROPOFOL PRN MLS: 10 INJECTION, EMULSION INTRAVENOUS at 22:49

## 2019-10-09 RX ADMIN — SODIUM CHLORIDE SCH MLS: 0.9 INJECTION, SOLUTION INTRAVENOUS at 20:10

## 2019-10-09 RX ADMIN — SODIUM CHLORIDE SCH MLS: 9 INJECTION, SOLUTION INTRAVENOUS at 09:02

## 2019-10-09 RX ADMIN — PROPOFOL PRN MLS: 10 INJECTION, EMULSION INTRAVENOUS at 03:04

## 2019-10-09 RX ADMIN — Medication SCH ML: at 20:10

## 2019-10-09 NOTE — P.PN
Subjective


Date of Service: 10/09/19


Chief Complaint:  delirium tremens





Patient seen and examined at bedside.  No family at bedside.  Chart reviewed 

and case discussed with nursing staff.


Patient currently continues to be intubated and sedated.  Unable to be weaned 

off sedation at this time due to agitation.





Review of Systems


10-point ROS is otherwise unremarkable





Physical Examination





- Vital Signs


Temperature: 98.6 F


Blood Pressure: 125/79


Pulse: 66


Respirations: 14


Pulse Ox (%): 99





- Physical Exam


General: Other (Intubated and sedated; seems to get agitated even with sedation)


HEENT: Atraumatic, PERRLA, EOMI


Neck: Supple, JVD not distended


Respiratory: Other (Intubated and sedated)


Gastrointestinal: Normal bowel sounds, No tenderness


Musculoskeletal: No tenderness





Assessment And Plan





- Plan





1.  Continue Vent management/  IV hydration


2.  Continue IV Antibiotic


3.  Continue IV sedation -patient gets agitated even with trial of weaning down 

sedation at this time.


4.  pain control


5. GI DVT prophylaxis


6.  Continue monitoring labs closely while intubated


7.  Monitor chest x-ray daily

## 2019-10-09 NOTE — RAD REPORT
EXAM DESCRIPTION:  RAD - Chest Single View - 10/9/2019 6:18 am

 

CLINICAL HISTORY:  r/o aspiration

Chest pain.

 

COMPARISON:  Chest Single View dated 10/8/2019

 

FINDINGS:  Portable technique limits examination quality.

 

The lungs are grossly clear. The heart is normal in size. ET tube tip is above the catia. Enteric tu
be descends with its tip in the stomach.

## 2019-10-10 LAB
BUN BLD-MCNC: 5 MG/DL (ref 7–18)
GLUCOSE SERPLBLD-MCNC: 69 MG/DL (ref 74–106)
MAGNESIUM SERPL-MCNC: 1.9 MG/DL (ref 1.8–2.4)
POTASSIUM SERPL-SCNC: 3.3 MMOL/L (ref 3.5–5.1)

## 2019-10-10 RX ADMIN — FENTANYL CITRATE PRN MCG: 50 INJECTION, SOLUTION INTRAMUSCULAR; INTRAVENOUS at 01:58

## 2019-10-10 RX ADMIN — HALOPERIDOL LACTATE PRN MG: 5 INJECTION, SOLUTION INTRAMUSCULAR at 01:04

## 2019-10-10 RX ADMIN — Medication SCH ML: at 10:52

## 2019-10-10 RX ADMIN — SODIUM CHLORIDE SCH MLS: 9 INJECTION, SOLUTION INTRAVENOUS at 10:50

## 2019-10-10 RX ADMIN — NICOTINE SCH MG: 21 PATCH TRANSDERMAL at 16:55

## 2019-10-10 RX ADMIN — SODIUM CHLORIDE SCH: 0.9 INJECTION, SOLUTION INTRAVENOUS at 05:00

## 2019-10-10 RX ADMIN — LEVETIRACETAM SCH MLS: 100 INJECTION, SOLUTION, CONCENTRATE INTRAVENOUS at 10:50

## 2019-10-10 RX ADMIN — SODIUM CHLORIDE SCH: 0.9 INJECTION, SOLUTION INTRAVENOUS at 15:00

## 2019-10-10 RX ADMIN — ENOXAPARIN SODIUM SCH MG: 40 INJECTION SUBCUTANEOUS at 10:51

## 2019-10-10 RX ADMIN — Medication SCH ML: at 21:52

## 2019-10-10 RX ADMIN — LEVETIRACETAM SCH MLS: 100 INJECTION, SOLUTION, CONCENTRATE INTRAVENOUS at 21:52

## 2019-10-10 NOTE — P.PN
Subjective


Date of Service: 10/10/19


Chief Complaint:  delirium tremens


Subjective: Improving





Patient seen and examined at bedside.  No family at bedside.  Chart reviewed 

and case discussed with nursing staff.


Patient successfully extubated today


Doing well post extubation, tolerating oral diet





Review of Systems


10-point ROS is otherwise unremarkable





Physical Examination





- Vital Signs


Temperature: 100.8 F


Blood Pressure: 138/87


Pulse: 109


Respirations: 22


Pulse Ox (%): 96





- Physical Exam


General: Alert, In no apparent distress


HEENT: Atraumatic, PERRLA, EOMI


Neck: Supple, JVD not distended


Respiratory: Clear to auscultation bilaterally, Normal air movement


Cardiovascular: Regular rate/rhythm, Normal S1 S2


Gastrointestinal: Normal bowel sounds, No tenderness


Musculoskeletal: No tenderness


Integumentary: No rashes


Neurological: Normal speech, Normal tone, Normal affect


Lymphatics: No axilla or inguinal lymphadenopathy





Assessment And Plan





- Plan





1.  Extubated, continue to monitor vital signs in the ICU.


2.  Continue IV Antibiotic


3.  Continue alcohol withdrawal precautions/medication


4.  pain control


5. GI DVT prophylaxis





Disposition:  Continue to monitor in ICU overnight, stable, can transfer to the 

floor.  Continue to monitor for DTs and alcohol withdrawal symptoms.

## 2019-10-10 NOTE — RAD REPORT
EXAM DESCRIPTION:  RAD - Hand Right 3 View - 10/10/2019 10:10 am

 

CLINICAL HISTORY:  Right hand pain, soft tissue swelling, unknown injury

 

COMPARISON:  None.

 

FINDINGS:  No fracture is identified. There is no dislocation or periosteal reaction noted.  Prominen
t soft tissue over the dorsum of the hand. No air, foreign body or calcification in the soft tissues.


 

IMPRESSION:  Right hand soft tissue swelling without air, foreign body or abnormal calcification.

 

No acute bone or joint finding.

## 2019-10-11 LAB
ALBUMIN SERPL BCP-MCNC: 3.4 G/DL (ref 3.4–5)
ALP SERPL-CCNC: 282 U/L (ref 45–117)
ALT SERPL W P-5'-P-CCNC: 525 U/L (ref 12–78)
AST SERPL W P-5'-P-CCNC: 347 U/L (ref 15–37)
BUN BLD-MCNC: 7 MG/DL (ref 7–18)
GLUCOSE SERPLBLD-MCNC: 121 MG/DL (ref 74–106)
HCT VFR BLD CALC: 39.5 % (ref 39.6–49)
LYMPHOCYTES # SPEC AUTO: 1.4 K/UL (ref 0.7–4.9)
PMV BLD: 9 FL (ref 7.6–11.3)
POTASSIUM SERPL-SCNC: 3.6 MMOL/L (ref 3.5–5.1)
RBC # BLD: 4 M/UL (ref 4.33–5.43)

## 2019-10-11 RX ADMIN — Medication SCH ML: at 22:07

## 2019-10-11 RX ADMIN — NICOTINE SCH MG: 21 PATCH TRANSDERMAL at 08:33

## 2019-10-11 RX ADMIN — LEVETIRACETAM SCH MLS: 100 INJECTION, SOLUTION, CONCENTRATE INTRAVENOUS at 22:08

## 2019-10-11 RX ADMIN — LEVETIRACETAM SCH MLS: 100 INJECTION, SOLUTION, CONCENTRATE INTRAVENOUS at 09:41

## 2019-10-11 RX ADMIN — ENOXAPARIN SODIUM SCH MG: 40 INJECTION SUBCUTANEOUS at 08:34

## 2019-10-11 RX ADMIN — Medication SCH ML: at 08:36

## 2019-10-11 RX ADMIN — SODIUM CHLORIDE SCH MLS: 9 INJECTION, SOLUTION INTRAVENOUS at 09:41

## 2019-10-11 NOTE — P.PN
Subjective


Date of Service: 10/11/19


Chief Complaint:  delirium tremens


Subjective: Tolerating diet, Improving





Patient seen and examined at bedside.  No family at bedside.  Chart reviewed 

and case discussed with nursing staff.


Patient successfully extubated yesterday.  Doing well post extubation.


Alert awake and oriented, tolerating an oral diet.


Continues to have episodes of agitation


Hemodynamically stable








Review of Systems


10-point ROS is otherwise unremarkable





Physical Examination





- Vital Signs


Temperature: 99 F


Blood Pressure: 121/90


Pulse: 107


Respirations: 19


Pulse Ox (%): 100





- Physical Exam


General: Alert, In no apparent distress, Oriented x3


HEENT: Atraumatic, PERRLA, EOMI


Neck: Supple, JVD not distended


Respiratory: Clear to auscultation bilaterally, Normal air movement


Cardiovascular: Regular rate/rhythm, Normal S1 S2


Gastrointestinal: Normal bowel sounds, No tenderness


Musculoskeletal: No tenderness


Integumentary: No rashes


Neurological: Normal speech, Normal tone, Normal affect


Lymphatics: No axilla or inguinal lymphadenopathy





Assessment And Plan





- Plan





1.  Extubated, doing well, hemodynamically stable.  Can transfer to the floor


2.  Continue IV Antibiotic


3.  Continue alcohol withdrawal precautions/medication


4.  pain control


5. GI DVT prophylaxis





Disposition:  can transfer to the floor.  Continue to monitor for DTs and 

alcohol withdrawal symptoms.  Will get physical therapy evaluation

## 2019-10-12 LAB
ALBUMIN SERPL BCP-MCNC: 3.4 G/DL (ref 3.4–5)
ALP SERPL-CCNC: 218 U/L (ref 45–117)
ALT SERPL W P-5'-P-CCNC: 398 U/L (ref 12–78)
AST SERPL W P-5'-P-CCNC: 214 U/L (ref 15–37)
BUN BLD-MCNC: 9 MG/DL (ref 7–18)
GLUCOSE SERPLBLD-MCNC: 116 MG/DL (ref 74–106)
HCT VFR BLD CALC: 39.7 % (ref 39.6–49)
LYMPHOCYTES # SPEC AUTO: 1.4 K/UL (ref 0.7–4.9)
MORPHOLOGY BLD-IMP: (no result)
PMV BLD: 9 FL (ref 7.6–11.3)
POTASSIUM SERPL-SCNC: 3.5 MMOL/L (ref 3.5–5.1)
RBC # BLD: 3.99 M/UL (ref 4.33–5.43)
TARGETS BLD QL SMEAR: (no result)

## 2019-10-12 RX ADMIN — LEVETIRACETAM SCH MLS: 100 INJECTION, SOLUTION, CONCENTRATE INTRAVENOUS at 20:43

## 2019-10-12 RX ADMIN — ENOXAPARIN SODIUM SCH MG: 40 INJECTION SUBCUTANEOUS at 08:56

## 2019-10-12 RX ADMIN — Medication SCH ML: at 09:07

## 2019-10-12 RX ADMIN — Medication SCH ML: at 20:43

## 2019-10-12 RX ADMIN — LEVETIRACETAM SCH MLS: 100 INJECTION, SOLUTION, CONCENTRATE INTRAVENOUS at 08:54

## 2019-10-12 RX ADMIN — NICOTINE SCH MG: 21 PATCH TRANSDERMAL at 08:55

## 2019-10-12 NOTE — RAD REPORT
EXAM DESCRIPTION:  US - Abdomen Exam Complete - 10/12/2019 11:54 am

 

CLINICAL HISTORY:  Abdominal pain.

Hx ETOH use, evaluate for cirrhosis of liver

 

COMPARISON:  No comparisons

 

FINDINGS:  Diffuse fatty liver is present. No focal liver lesions or intrahepatic biliary dilatation 
is seen.

 

The gallbladder demonstrates no gallstones, pericholecystic fluid or gallbladder wall thickening.  Co
mmon bile duct is normal in caliber measuring 2 mm.

 

Both kidneys are normal in size, shape and echotexture. No hydronephrosis, focal lesion of concern or
 perinephric fluid.

 

The spleen is normal in size measuring 12 cm.

 

The pancreas and aorta are obscured by bowel gas.

 

The visualized aspects of the IVC are grossly normal.

 

IMPRESSION:  Diffuse fatty liver.

## 2019-10-12 NOTE — P.PN
Subjective


Date of Service: 10/12/19


Chief Complaint:  delirium tremens


Subjective: Improving





Patient seen and examined at bedside.  No family at bedside.  Chart reviewed 

and case discussed with nursing staff.


Patient successfully extubated.  Doing well post extubation.


Alert awake and oriented, tolerating an oral diet.


Improved agitation. Working with PT


Hemodynamically stable








Review of Systems


10-point ROS is otherwise unremarkable





Physical Examination





- Vital Signs


Temperature: 98.8 F


Blood Pressure: 142/96


Pulse: 90


Respirations: 17


Pulse Ox (%): 98





- Physical Exam


General: Alert, In no apparent distress, Oriented x3


HEENT: Atraumatic, PERRLA, EOMI


Neck: Supple, JVD not distended


Respiratory: Clear to auscultation bilaterally, Normal air movement


Cardiovascular: Regular rate/rhythm, Normal S1 S2


Gastrointestinal: Normal bowel sounds, No tenderness


Musculoskeletal: No tenderness


Integumentary: No rashes


Neurological: Normal speech, Normal tone, Normal affect


Lymphatics: No axilla or inguinal lymphadenopathy





Assessment And Plan





- Plan





1.  Extubated, doing well, hemodynamically stable.  


2. Working with PT


3.  Continue alcohol withdrawal precautions/medication. Discussed Alcohol 

cessation. Resources will be provided. 


4.  pain control


5. GI DVT prophylaxis





Disposition:  Continue to monitor for DTs and alcohol withdrawal symptoms.  

Anticipate discharge in the next 24 hrs.

## 2019-10-13 VITALS — DIASTOLIC BLOOD PRESSURE: 81 MMHG | TEMPERATURE: 98.5 F | SYSTOLIC BLOOD PRESSURE: 128 MMHG

## 2019-10-13 VITALS — OXYGEN SATURATION: 95 %

## 2019-10-13 LAB
ALBUMIN SERPL BCP-MCNC: 3.4 G/DL (ref 3.4–5)
ALP SERPL-CCNC: 204 U/L (ref 45–117)
ALT SERPL W P-5'-P-CCNC: 457 U/L (ref 12–78)
AST SERPL W P-5'-P-CCNC: 267 U/L (ref 15–37)
BUN BLD-MCNC: 13 MG/DL (ref 7–18)
GLUCOSE SERPLBLD-MCNC: 106 MG/DL (ref 74–106)
HCT VFR BLD CALC: 38.6 % (ref 39.6–49)
LYMPHOCYTES # SPEC AUTO: 1.6 K/UL (ref 0.7–4.9)
PMV BLD: 8.4 FL (ref 7.6–11.3)
POTASSIUM SERPL-SCNC: 3.6 MMOL/L (ref 3.5–5.1)
RBC # BLD: 3.87 M/UL (ref 4.33–5.43)

## 2019-10-13 RX ADMIN — ENOXAPARIN SODIUM SCH MG: 40 INJECTION SUBCUTANEOUS at 08:08

## 2019-10-13 RX ADMIN — Medication SCH ML: at 09:42

## 2019-10-13 RX ADMIN — NICOTINE SCH MG: 21 PATCH TRANSDERMAL at 08:05

## 2019-10-13 RX ADMIN — FENTANYL CITRATE PRN MCG: 50 INJECTION, SOLUTION INTRAMUSCULAR; INTRAVENOUS at 00:31

## 2019-10-13 RX ADMIN — LEVETIRACETAM SCH MLS: 100 INJECTION, SOLUTION, CONCENTRATE INTRAVENOUS at 09:39

## 2019-10-13 NOTE — P.DS
Admission Date: 10/08/19


Discharge Date: 10/13/19


Disposition: ROUTINE DISCHARGE


Discharge Condition: GOOD


Reason for Admission:  delirium tremens


Brief History of Present Illness: 


Patient is a 34-year-old gentleman who comes into the hospital going through 

\[facility] [place] [facility]\"".  Patient drinks about a half of 1 L of vodka every day.  Patient had gone 

out of town to [place].  Over the last couple of days he has had no alcohol.  

He had been feeling poorly during the trip and had a few episodes of nausea and 

vomiting as well.  Patient was seen in the ER and admitted to the hospital for 

further workup.  Patient started becoming more obtunded in the ER. Decision was 

made to intubate and sedate as patient has high risk of aspiration and fall.  

patient became very combative while in the emergency room.  After giving IV 

Versed patient finally relaxed and had to be intubated. Patient had a CT of the 

head which was negative. Patient will be admitted for further evaluation. 


Hospital Course: 


Patient was intubated in the ER, transferred to the ICU.  Patient was very 

difficult to stay, was requiring lots of sedation medications.  Eventually, 

patient was extubated in he did well post extubation.  He stabilized 

hemodynamically, was then transferred to the floor.  He was working with 

physical therapy, was unsteady on his feet.  But prior to discharge, he 

continued working with physical therapy and was steady on his feet, able to 

ambulate without problems.  His labs showed elevated LFTs.  Abdominal 

ultrasound was done, which showed diffuse fatty liver disease.  His LFTs did 

trend down prior to discharge.  There were elevated likely secondary to alcohol 

use.  He will have continued follow up with outpatient GI.  Information was 

provided to him.


His diagnoses and treatment plan was explained to him in detail.  He was 

counseled and educated on alcohol intake and cessation.  All questions were 

answered, he verbalized understanding.  He was then discharged home in a safe 

and stable manner.





Vital Signs/Physical Exam: 














Temp Pulse Resp BP Pulse Ox


 


 98.0 F   75   17   137/86   95 


 


 10/13/19 08:00  10/13/19 08:00  10/13/19 08:00  10/13/19 08:00  10/13/19 08:00








General: Alert, In no apparent distress, Oriented x3


HEENT: Atraumatic, PERRLA, EOMI


Neck: Supple, JVD not distended


Respiratory: Clear to auscultation bilaterally, Normal air movement


Cardiovascular: Regular rate/rhythm, Normal S1 S2


Gastrointestinal: Normal bowel sounds, No tenderness


Musculoskeletal: No tenderness


Integumentary: No rashes


Neurological: Normal speech, Normal tone, Normal affect


Lymphatics: No axilla or inguinal lymphadenopathy


Laboratory Data at Discharge: 














WBC  7.1 K/uL (4.3-10.9)   10/13/19  06:06    


 


Hgb  13.4 g/dL (13.6-17.9)  L  10/13/19  06:06    


 


Hct  38.6 % (39.6-49.0)  L  10/13/19  06:06    


 


Plt Count  289 K/uL (152-406)  D 10/13/19  06:06    


 


PT  11.6 SECONDS (9.5-12.5)   10/09/19  05:09    


 


INR  0.98   10/09/19  05:09    


 


APTT  30.5 SECONDS (24.3-36.9)   10/09/19  05:09    


 


Sodium  139 mmol/L (136-145)   10/13/19  06:06    


 


Potassium  3.6 mmol/L (3.5-5.1)   10/13/19  06:06    


 


BUN  13 mg/dL (7-18)   10/13/19  06:06    


 


Creatinine  0.74 mg/dL (0.55-1.3)   10/13/19  06:06    


 


Glucose  106 mg/dL ()   10/13/19  06:06    


 


Phosphorus  3.1 mg/dL (2.5-4.9)   10/10/19  04:55    


 


Magnesium  1.9 mg/dL (1.8-2.4)   10/10/19  04:55    


 


Total Bilirubin  1.2 mg/dL (0.2-1.0)  H  10/13/19  06:06    


 


AST  267 U/L (15-37)  H  10/13/19  06:06    


 


ALT  457 U/L (12-78)  H*  10/13/19  06:06    


 


Alkaline Phosphatase  204 U/L ()  H  10/13/19  06:06    


 


Troponin I  < 0.02 ng/mL (0.0-0.045)   10/08/19  11:02    


 


Lipase  129 U/L ()   10/08/19  11:02    








Home Medications: 








chlordiazePOXIDE HCl [Librium*] 25 mg PO Q6HR #40 cap 10/13/19 





New Medications: 


chlordiazePOXIDE HCl [Librium*] 25 mg PO Q6HR #40 cap


Patient Discharge Instructions: Please establish care with a primary care 

physician. A list has been provided to you.  Follow up with GI. Information has 

been provided to you.  Return to the ER for worsening symptoms.


Diet: Regular


Activity: Ad ruben


Followup: 


Luis Felipe Daily MD [ACTIVE - CAN ADMIT] - 


Shabbir Phillip MD [ASSOCIATE-ACTIVE - CAN ADMIT] - 


Time spent managing pt's care (in minutes): 55

## 2021-05-04 ENCOUNTER — HISTORICAL (OUTPATIENT)
Dept: ADMINISTRATIVE | Facility: HOSPITAL | Age: 36
End: 2021-05-04

## 2021-05-04 LAB
ALBUMIN SERPL BCP-MCNC: 3.9 G/DL (ref 3.5–5)
ALBUMIN/GLOB SERPL: 0.9 {RATIO}
ALP SERPL-CCNC: 281 U/L (ref 45–115)
ALT SERPL W P-5'-P-CCNC: 425 U/L (ref 16–61)
AMPHET UR QL SCN: NEGATIVE
ANION GAP SERPL CALCULATED.3IONS-SCNC: 16 MMOL/L
APAP UR QL SCN: ABNORMAL
AST SERPL W P-5'-P-CCNC: 611 U/L (ref 15–37)
BARBITURATES UR QL SCN: NEGATIVE
BASOPHILS # BLD AUTO: 0.01 X10E3/UL (ref 0–0.2)
BASOPHILS NFR BLD AUTO: 0.1 % (ref 0–1)
BENZODIAZ METAB UR QL SCN: NEGATIVE
BILIRUB SERPL-MCNC: 2.2 MG/DL (ref 0–1.2)
BUN SERPL-MCNC: 7 MG/DL (ref 7–18)
BUN/CREAT SERPL: 8
CALCIUM SERPL-MCNC: 9.7 MG/DL (ref 8.5–10.1)
CHLORIDE SERPL-SCNC: 97 MMOL/L (ref 98–107)
CO2 SERPL-SCNC: 30 MMOL/L (ref 21–32)
COCAINE UR QL SCN: ABNORMAL
CREAT SERPL-MCNC: 0.88 MG/DL (ref 0.7–1.3)
EOSINOPHIL # BLD AUTO: 0.01 X10E3/UL (ref 0–0.5)
EOSINOPHIL NFR BLD AUTO: 0.1 % (ref 1–4)
ERYTHROCYTE [DISTWIDTH] IN BLOOD BY AUTOMATED COUNT: 15.8 % (ref 11.5–14.5)
GLOBULIN SER-MCNC: 4.5 G/DL (ref 2–4)
GLUCOSE SERPL-MCNC: 138 MG/DL (ref 74–106)
HCT VFR BLD AUTO: 40.4 % (ref 40–54)
HGB BLD-MCNC: 14.3 G/DL (ref 13.5–18)
IMM GRANULOCYTES # BLD AUTO: 0.04 X10E3/UL (ref 0–0.04)
IMM GRANULOCYTES NFR BLD: 0.6 % (ref 0–0.4)
LYMPHOCYTES # BLD AUTO: 0.4 X10E3/UL (ref 1–4.8)
LYMPHOCYTES NFR BLD AUTO: 5.6 % (ref 27–41)
MAGNESIUM SERPL-MCNC: 1.8 MG/DL (ref 1.7–2.3)
MCH RBC QN AUTO: 33.5 PG (ref 27–31)
MCHC RBC AUTO-ENTMCNC: 35.4 G/DL (ref 32–36)
MCV RBC AUTO: 94.6 FL (ref 80–96)
MDA UR QL SCN: NEGATIVE
METHADONE UR QL SCN: NEGATIVE
METHAMPHET UR QL SCN: ABNORMAL
MONOCYTES # BLD AUTO: 0.72 X10E3/UL (ref 0–0.8)
MONOCYTES NFR BLD AUTO: 10 % (ref 2–6)
MPC BLD CALC-MCNC: 10.3 FL (ref 9.4–12.4)
NEUTROPHILS # BLD AUTO: 6 X10E3/UL (ref 1.8–7.7)
NEUTROPHILS NFR BLD AUTO: 83.6 % (ref 53–65)
OPIATES UR QL SCN: NEGATIVE
OXYCODONE UR QL SCN: NEGATIVE
PCP UR QL SCN: NEGATIVE
PLATELET # BLD AUTO: 107 X10E3/UL (ref 150–400)
POTASSIUM SERPL-SCNC: 3.9 MMOL/L (ref 3.5–5.1)
PROPOXYPH UR QL SCN: NEGATIVE
PROT SERPL-MCNC: 8.4 G/DL (ref 6.4–8.2)
RBC # BLD AUTO: 4.27 X10E6/UL (ref 4.6–6.2)
SODIUM SERPL-SCNC: 139 MMOL/L (ref 136–145)
THC UR QL SCN: POSITIVE
TRICYCLICS UR QL SCN: NEGATIVE
TROPONIN I SERPL-MCNC: <0.017 NG/ML (ref 0–0.06)
WBC # BLD AUTO: 7.18 X10E3/UL (ref 4.5–11)

## 2021-05-07 ENCOUNTER — HOSPITAL ENCOUNTER (INPATIENT)
Facility: HOSPITAL | Age: 36
LOS: 1 days | Discharge: LEFT BEFORE DISCHARGE | DRG: 894 | End: 2021-05-07
Attending: INTERNAL MEDICINE | Admitting: INTERNAL MEDICINE
Payer: MEDICAID

## 2021-05-07 ENCOUNTER — HISTORICAL (OUTPATIENT)
Dept: ADMINISTRATIVE | Facility: HOSPITAL | Age: 36
End: 2021-05-07

## 2021-05-07 VITALS
HEIGHT: 72 IN | BODY MASS INDEX: 24.16 KG/M2 | TEMPERATURE: 98 F | HEART RATE: 112 BPM | OXYGEN SATURATION: 97 % | DIASTOLIC BLOOD PRESSURE: 100 MMHG | SYSTOLIC BLOOD PRESSURE: 145 MMHG | WEIGHT: 178.38 LBS | RESPIRATION RATE: 15 BRPM

## 2021-05-07 DIAGNOSIS — F10.931 ALCOHOL WITHDRAWAL SYNDROME, WITH DELIRIUM: ICD-10-CM

## 2021-05-07 DIAGNOSIS — R41.82 AMS (ALTERED MENTAL STATUS): ICD-10-CM

## 2021-05-07 PROBLEM — R74.8 ELEVATED LIVER ENZYMES: Status: ACTIVE | Noted: 2021-05-07

## 2021-05-07 PROBLEM — R45.1 AGITATION: Status: ACTIVE | Noted: 2021-05-07

## 2021-05-07 LAB
ALBUMIN SERPL BCP-MCNC: 3.8 G/DL (ref 3.5–5)
ALBUMIN/GLOB SERPL: 0.9 {RATIO}
ALP SERPL-CCNC: 234 U/L (ref 45–115)
ALT SERPL W P-5'-P-CCNC: 416 U/L (ref 16–61)
ANION GAP SERPL CALCULATED.3IONS-SCNC: 14 MMOL/L
APTT PPP: 28.1 SECONDS (ref 25.2–37.3)
AST SERPL W P-5'-P-CCNC: 467 U/L (ref 15–37)
BASOPHILS # BLD AUTO: 0.02 X10E3/UL (ref 0–0.2)
BASOPHILS NFR BLD AUTO: 0.3 % (ref 0–1)
BILIRUB SERPL-MCNC: 3.6 MG/DL (ref 0–1.2)
BILIRUB UR QL STRIP: NEGATIVE MG/DL
BUN SERPL-MCNC: 15 MG/DL (ref 7–18)
BUN/CREAT SERPL: 15.8
CALCIUM SERPL-MCNC: 9.1 MG/DL (ref 8.5–10.1)
CHLORIDE SERPL-SCNC: 99 MMOL/L (ref 98–107)
CLARITY UR: CLEAR
CO2 SERPL-SCNC: 27 MMOL/L (ref 21–32)
COLOR UR: YELLOW
CREAT SERPL-MCNC: 0.95 MG/DL (ref 0.7–1.3)
EOSINOPHIL # BLD AUTO: 0.07 X10E3/UL (ref 0–0.5)
EOSINOPHIL NFR BLD AUTO: 1 % (ref 1–4)
ERYTHROCYTE [DISTWIDTH] IN BLOOD BY AUTOMATED COUNT: 15.4 % (ref 11.5–14.5)
ETHANOL SERPL-MCNC: NORMAL MG/DL (ref 0–10)
GLOBULIN SER-MCNC: 4.2 G/DL (ref 2–4)
GLUCOSE SERPL-MCNC: 84 MG/DL (ref 74–106)
GLUCOSE UR STRIP-MCNC: NEGATIVE MG/DL
HAV IGM SER QL: NORMAL
HBV CORE IGM SER QL: NORMAL
HBV SURFACE AG SERPL QL IA: NORMAL
HCT VFR BLD AUTO: 37.3 % (ref 40–54)
HCV AB SER QL: NORMAL
HGB BLD-MCNC: 12.9 G/DL (ref 13.5–18)
IMM GRANULOCYTES # BLD AUTO: 0.02 X10E3/UL (ref 0–0.04)
IMM GRANULOCYTES NFR BLD: 0.3 % (ref 0–0.4)
KETONES UR STRIP-SCNC: NEGATIVE MG/DL
LACTATE SERPL-SCNC: 0.8 MMOL/L (ref 0.4–2)
LEUKOCYTE ESTERASE UR QL STRIP: NEGATIVE LEU/UL
LYMPHOCYTES # BLD AUTO: 1.41 X10E3/UL (ref 1–4.8)
LYMPHOCYTES NFR BLD AUTO: 21 % (ref 27–41)
MAGNESIUM SERPL-MCNC: 1.9 MG/DL (ref 1.7–2.3)
MCH RBC QN AUTO: 33.3 PG (ref 27–31)
MCHC RBC AUTO-ENTMCNC: 34.6 G/DL (ref 32–36)
MCV RBC AUTO: 96.4 FL (ref 80–96)
MONOCYTES # BLD AUTO: 1.4 X10E3/UL (ref 0–0.8)
MONOCYTES NFR BLD AUTO: 20.9 % (ref 2–6)
MPC BLD CALC-MCNC: 10.4 FL (ref 9.4–12.4)
NEUTROPHILS # BLD AUTO: 3.78 X10E3/UL (ref 1.8–7.7)
NEUTROPHILS NFR BLD AUTO: 56.5 % (ref 53–65)
NITRITE UR QL STRIP: NEGATIVE
PH UR STRIP: 6 PH UNITS (ref 5–8)
PLATELET # BLD AUTO: 164 X10E3/UL (ref 150–400)
POTASSIUM SERPL-SCNC: 3.5 MMOL/L (ref 3.5–5.1)
PROT SERPL-MCNC: 8 G/DL (ref 6.4–8.2)
PROT UR QL STRIP: NEGATIVE MG/DL
PROTHROMBIN TIME: 12.9 SECONDS (ref 11.7–14.7)
RBC # BLD AUTO: 3.87 X10E6/UL (ref 4.6–6.2)
RBC # UR STRIP: NEGATIVE ERY/UL
SODIUM SERPL-SCNC: 136 MMOL/L (ref 136–145)
SP GR UR STRIP: 1.01 (ref 1–1.03)
TROPONIN I SERPL-MCNC: <0.017 NG/ML (ref 0–0.06)
UROBILINOGEN UR STRIP-ACNC: 2 MG/DL
WBC # BLD AUTO: 6.7 X10E3/UL (ref 4.5–11)

## 2021-05-07 PROCEDURE — 25000003 PHARM REV CODE 250: Performed by: FAMILY MEDICINE

## 2021-05-07 PROCEDURE — 99223 PR INITIAL HOSPITAL CARE,LEVL III: ICD-10-PCS | Mod: ,,, | Performed by: INTERNAL MEDICINE

## 2021-05-07 PROCEDURE — 85610 PROTHROMBIN TIME: CPT | Performed by: FAMILY MEDICINE

## 2021-05-07 PROCEDURE — 80074 ACUTE HEPATITIS PANEL: CPT | Performed by: FAMILY MEDICINE

## 2021-05-07 PROCEDURE — 99223 1ST HOSP IP/OBS HIGH 75: CPT | Mod: ,,, | Performed by: INTERNAL MEDICINE

## 2021-05-07 PROCEDURE — 63600175 PHARM REV CODE 636 W HCPCS: Performed by: FAMILY MEDICINE

## 2021-05-07 PROCEDURE — S4991 NICOTINE PATCH NONLEGEND: HCPCS | Performed by: FAMILY MEDICINE

## 2021-05-07 PROCEDURE — 36415 COLL VENOUS BLD VENIPUNCTURE: CPT | Performed by: FAMILY MEDICINE

## 2021-05-07 PROCEDURE — 85730 THROMBOPLASTIN TIME PARTIAL: CPT | Performed by: FAMILY MEDICINE

## 2021-05-07 PROCEDURE — 20000000 HC ICU ROOM

## 2021-05-07 RX ORDER — IBUPROFEN 200 MG
1 TABLET ORAL DAILY
Status: DISCONTINUED | OUTPATIENT
Start: 2021-05-07 | End: 2021-05-07 | Stop reason: HOSPADM

## 2021-05-07 RX ORDER — LORAZEPAM 2 MG/ML
2 INJECTION INTRAMUSCULAR
Status: DISCONTINUED | OUTPATIENT
Start: 2021-05-07 | End: 2021-05-07

## 2021-05-07 RX ORDER — LORAZEPAM 2 MG/ML
2 INJECTION INTRAMUSCULAR
Status: DISCONTINUED | OUTPATIENT
Start: 2021-05-07 | End: 2021-05-07 | Stop reason: HOSPADM

## 2021-05-07 RX ORDER — FOLIC ACID 1 MG/1
1 TABLET ORAL DAILY
Status: DISCONTINUED | OUTPATIENT
Start: 2021-05-07 | End: 2021-05-07 | Stop reason: HOSPADM

## 2021-05-07 RX ADMIN — FOLIC ACID 1 MG: 1 TABLET ORAL at 02:05

## 2021-05-07 RX ADMIN — THERA TABS 1 TABLET: TAB at 02:05

## 2021-05-07 RX ADMIN — FOLIC ACID: 5 INJECTION, SOLUTION INTRAMUSCULAR; INTRAVENOUS; SUBCUTANEOUS at 01:05

## 2021-05-07 RX ADMIN — NICOTINE 1 PATCH: 14 PATCH, EXTENDED RELEASE TRANSDERMAL at 01:05
